# Patient Record
Sex: FEMALE | Race: WHITE | URBAN - METROPOLITAN AREA
[De-identification: names, ages, dates, MRNs, and addresses within clinical notes are randomized per-mention and may not be internally consistent; named-entity substitution may affect disease eponyms.]

---

## 2018-01-18 ENCOUNTER — EMERGENCY (EMERGENCY)
Facility: HOSPITAL | Age: 23
LOS: 0 days | Discharge: HOME | End: 2018-01-18

## 2018-01-18 DIAGNOSIS — Z79.899 OTHER LONG TERM (CURRENT) DRUG THERAPY: ICD-10-CM

## 2018-01-18 DIAGNOSIS — J09.X2 INFLUENZA DUE TO IDENTIFIED NOVEL INFLUENZA A VIRUS WITH OTHER RESPIRATORY MANIFESTATIONS: ICD-10-CM

## 2018-01-18 DIAGNOSIS — R05 COUGH: ICD-10-CM

## 2018-03-14 ENCOUNTER — EMERGENCY (EMERGENCY)
Facility: HOSPITAL | Age: 23
LOS: 0 days | Discharge: HOME | End: 2018-03-14

## 2018-03-14 VITALS
OXYGEN SATURATION: 100 % | TEMPERATURE: 97 F | HEART RATE: 70 BPM | DIASTOLIC BLOOD PRESSURE: 70 MMHG | SYSTOLIC BLOOD PRESSURE: 113 MMHG | RESPIRATION RATE: 16 BRPM

## 2018-03-14 DIAGNOSIS — J02.9 ACUTE PHARYNGITIS, UNSPECIFIED: ICD-10-CM

## 2018-03-14 DIAGNOSIS — J02.8 ACUTE PHARYNGITIS DUE TO OTHER SPECIFIED ORGANISMS: ICD-10-CM

## 2018-03-14 DIAGNOSIS — B97.89 OTHER VIRAL AGENTS AS THE CAUSE OF DISEASES CLASSIFIED ELSEWHERE: ICD-10-CM

## 2018-03-14 RX ORDER — IBUPROFEN 200 MG
600 TABLET ORAL ONCE
Qty: 0 | Refills: 0 | Status: COMPLETED | OUTPATIENT
Start: 2018-03-14 | End: 2018-03-14

## 2018-03-14 RX ORDER — DEXAMETHASONE 0.5 MG/5ML
10 ELIXIR ORAL ONCE
Qty: 0 | Refills: 0 | Status: COMPLETED | OUTPATIENT
Start: 2018-03-14 | End: 2018-03-14

## 2018-03-14 RX ADMIN — Medication 10 MILLIGRAM(S): at 04:06

## 2018-03-14 RX ADMIN — Medication 600 MILLIGRAM(S): at 04:05

## 2018-03-14 NOTE — ED PROVIDER NOTE - THROAT FINDINGS
NO TONGUE ELEVATION/no vesicles/uvula midline/THROAT RED/no exudate/NO VESICLES/ULCERS/NO DROOLING/NO STRIDOR

## 2018-03-14 NOTE — ED PROVIDER NOTE - OBJECTIVE STATEMENT
23 yo hx of acne of abx (ceftin) daily for 1.5 months present c/o sore throat x few hours. + dry coughing. swallowing worsen the pain. denies fever/chill/rhinorrhea/HA/dizziness/difficulty swallowing and breathing. denies abd pain/n/v/d.

## 2018-12-20 ENCOUNTER — EMERGENCY (EMERGENCY)
Facility: HOSPITAL | Age: 23
LOS: 0 days | Discharge: HOME | End: 2018-12-20
Admitting: PHYSICIAN ASSISTANT

## 2018-12-20 VITALS
SYSTOLIC BLOOD PRESSURE: 131 MMHG | TEMPERATURE: 98 F | OXYGEN SATURATION: 100 % | HEART RATE: 104 BPM | DIASTOLIC BLOOD PRESSURE: 84 MMHG | RESPIRATION RATE: 19 BRPM

## 2018-12-20 VITALS — HEART RATE: 96 BPM | OXYGEN SATURATION: 100 %

## 2018-12-20 DIAGNOSIS — B34.9 VIRAL INFECTION, UNSPECIFIED: ICD-10-CM

## 2018-12-20 DIAGNOSIS — J02.9 ACUTE PHARYNGITIS, UNSPECIFIED: ICD-10-CM

## 2018-12-20 RX ORDER — IBUPROFEN 200 MG
600 TABLET ORAL ONCE
Qty: 0 | Refills: 0 | Status: COMPLETED | OUTPATIENT
Start: 2018-12-20 | End: 2018-12-20

## 2018-12-20 RX ORDER — DEXAMETHASONE 0.5 MG/5ML
10 ELIXIR ORAL ONCE
Qty: 0 | Refills: 0 | Status: COMPLETED | OUTPATIENT
Start: 2018-12-20 | End: 2018-12-20

## 2018-12-20 RX ADMIN — Medication 600 MILLIGRAM(S): at 18:30

## 2018-12-20 RX ADMIN — Medication 10 MILLIGRAM(S): at 18:29

## 2018-12-20 NOTE — ED PROVIDER NOTE - PHYSICAL EXAMINATION
GENERAL: Well-developed, well-nourished, in no acute distress.  HEENT: Sclera clear. Conjunctiva non-injected. Tolerating oral secretions. Mucous membranes moist, oropharynx mildly erythematous without exudate. Tonsils 2+ bilaterally. Uvula midline and without edema. Nasal turbinates clear and without discharge.  NECK: supple w FROM. No cervical adenopathy.  CARDIOVASCULAR: Regular rate and rhythm, S1 and S2 present. No murmurs, rubs, or gallops. 2+ radial pulses bilaterally. No chest wall tenderness.  RESPIRATORY: Normal effort. Clear to auscultation bilaterally without wheezes, rales, or rhonchi.  INTEGUMENTARY: Warm, dry, no pallor or rashes. Capillary refill <2 seconds.  NEURO: A&Ox3. Speech clear. Gait normal.

## 2018-12-20 NOTE — ED PROVIDER NOTE - NS ED ROS FT
CONSTITUTIONAL: (+) fevers, (-) chills, (+) fatigue, (+) decreased appetite  EYES: (-) eye redness, (-) eye discharge  ENT: (-) ear pain, (-) ear drainage, (+) congestion, (-) rhinorrhea, (-) sinus pain, (+) sore throat, (-) difficulty swallowing  NECK: (-) neck pain, (-) lymphadenopathy  CARDIOVASCULAR: (-) chest pain, (-) palpitations  RESPIRATORY: (-) cough, (-) sputum, (-) shortness of breath  GI: (-) nausea, (-) vomiting, (-) diarrhea, (-) abdominal pain  MSK: (+) myalgias, (-) back pain, (-) gait difficulty  INTEGUMENTARY: (-) rashes, (-) pallor  NEURO: (-) headache, (-) dizziness, (-) lightheadedness

## 2018-12-20 NOTE — ED PROVIDER NOTE - PROGRESS NOTE DETAILS
patient seen and evaluated by me. discussed w patient that her symptoms likely d/t viral illness. will give 600 mg IBU and 10 mg po decadron prior to d/c. patient understands return precautions.

## 2018-12-20 NOTE — ED PROVIDER NOTE - OBJECTIVE STATEMENT
23 year old female w no significant pmhx presents to the ED with body aches, sore throat, low-grade fevers (tmax 100.2F), and nasal congestion for the last 2 days. patient states that her symptoms came on suddenly. no alleviating factors. she is not taking anything for her symptoms. Denies cough, SOB, chest pain, nausea, vomiting, diarrhea, abd pain, or decreased urine output. she did not receive the flu vaccine this year. 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

## 2020-04-26 ENCOUNTER — MESSAGE (OUTPATIENT)
Age: 25
End: 2020-04-26

## 2020-05-07 ENCOUNTER — APPOINTMENT (OUTPATIENT)
Dept: DISASTER EMERGENCY | Facility: CLINIC | Age: 25
End: 2020-05-07

## 2020-09-18 NOTE — ED PEDIATRIC NURSE NOTE - DISCHARGE DATE/TIME
budesonide-formoterol (SYMBICORT) 80-4.5 MCG/ACT inhaler 10.2 g 11 12/17/2019     Sig - Route: Inhale 2 puffs into the lungs 2 times daily. - Inhalation      LOV:12/17/19  Nov:around 12/17/20   14-Mar-2018 04:25

## 2021-06-04 ENCOUNTER — OUTPATIENT (OUTPATIENT)
Dept: OUTPATIENT SERVICES | Facility: HOSPITAL | Age: 26
LOS: 1 days | Discharge: HOME | End: 2021-06-04
Payer: COMMERCIAL

## 2021-06-04 VITALS
TEMPERATURE: 98 F | DIASTOLIC BLOOD PRESSURE: 80 MMHG | HEART RATE: 89 BPM | RESPIRATION RATE: 18 BRPM | SYSTOLIC BLOOD PRESSURE: 122 MMHG

## 2021-06-04 VITALS — DIASTOLIC BLOOD PRESSURE: 80 MMHG | SYSTOLIC BLOOD PRESSURE: 122 MMHG | HEART RATE: 89 BPM

## 2021-06-04 PROCEDURE — 99282 EMERGENCY DEPT VISIT SF MDM: CPT | Mod: 25

## 2021-06-04 PROCEDURE — 76817 TRANSVAGINAL US OBSTETRIC: CPT | Mod: 26

## 2021-06-04 NOTE — OB PROVIDER TRIAGE NOTE - NSHPPHYSICALEXAM_GEN_ALL_CORE
T(C): 36.7 (06-04-21 @ 12:10), Max: 36.7 (06-04-21 @ 12:10)  HR: 89 (06-04-21 @ 12:10) (89 - 89)  BP: 122/80 (06-04-21 @ 12:10) (122/80 - 122/80)  RR: 18 (06-04-21 @ 12:10) (18 - 18)    EFM: 138 bpm    Abd: soft, gravid, nontender, no CVA tenderness

## 2021-06-04 NOTE — OB PROVIDER TRIAGE NOTE - HISTORY OF PRESENT ILLNESS
25y  @ 18.5 weeks by LMP, confirmed by first trimester sonogram, JODY 10/31/2021, presents for abdominal tightening. Patient states she experienced upper abdominal tightening around 0700, associated with pressure in her back. She spoke to her OB who suggested eating and hydration. Patient states tightening was relieved with drinking and lying down. Denies trauma, HA, fever, blurry vision, chest pain, SOB, abdominal pain, constipation, diarrhea, nausea, vomiting, dysuria, frequency, hematuria, leg pain, and abnormal swelling of the hands and feet. Last sexual intercourse was 1 week ago. Last meal was this morning. Denies VB, LOF, and ctx. +FM. Last seen in the office on 2021. No complications during this pregnancy.

## 2021-06-04 NOTE — OB PROVIDER TRIAGE NOTE - NS_OBGYNHISTORY_OBGYN_ALL_OB_FT
OB Hx:  x 1. Largest 7-12                 G1 2020 FT  F 7-12 [facility] No complications +Epi    Gyn Hx:  Denies cysts, fibroids, abnormal paps, and STIs.

## 2021-06-04 NOTE — OB PROVIDER TRIAGE NOTE - NSOBPROVIDERNOTE_OBGYN_ALL_OB_FT
25y  @ 18.5 weeks, GBS unknown, not in  labor.    -Discharged Home  - Labor precautions reviewed  -PO Hydration encouraged  -Advised to count fetal kick counts  -Follow up with your scheduled OB visit 25y  @ 18.5 weeks, with resolved abdominal pain, long cervix, not in  labor.    -Discharged Home  - Labor precautions reviewed  -PO Hydration encouraged  -Follow up with your scheduled OB visit

## 2021-06-05 ENCOUNTER — TRANSCRIPTION ENCOUNTER (OUTPATIENT)
Age: 26
End: 2021-06-05

## 2023-08-03 NOTE — ED ADULT TRIAGE NOTE - TEMPERATURE IN FAHRENHEIT (DEGREES F)
Detail Level: Zone Initiate Treatment: Apply elidel  once daily for one more week then stop Continue Regimen: Apply cicalfate to face 1-2 times a day Render In Strict Bullet Format?: No Plan: Pt has greatly improved, pt will continue with desonide if needed for flares 96.9

## 2023-10-12 ENCOUNTER — OUTPATIENT (OUTPATIENT)
Dept: OUTPATIENT SERVICES | Facility: HOSPITAL | Age: 28
LOS: 1 days | End: 2023-10-12
Payer: SELF-PAY

## 2023-10-12 VITALS
RESPIRATION RATE: 16 BRPM | DIASTOLIC BLOOD PRESSURE: 76 MMHG | SYSTOLIC BLOOD PRESSURE: 113 MMHG | TEMPERATURE: 98 F | OXYGEN SATURATION: 100 % | HEIGHT: 63 IN | HEART RATE: 71 BPM | WEIGHT: 130.07 LBS

## 2023-10-12 DIAGNOSIS — Z01.818 ENCOUNTER FOR OTHER PREPROCEDURAL EXAMINATION: ICD-10-CM

## 2023-10-12 DIAGNOSIS — Z98.890 OTHER SPECIFIED POSTPROCEDURAL STATES: Chronic | ICD-10-CM

## 2023-10-12 DIAGNOSIS — N62 HYPERTROPHY OF BREAST: ICD-10-CM

## 2023-10-12 LAB
ALBUMIN SERPL ELPH-MCNC: 4.6 G/DL — SIGNIFICANT CHANGE UP (ref 3.5–5.2)
ALP SERPL-CCNC: 46 U/L — SIGNIFICANT CHANGE UP (ref 30–115)
ALT FLD-CCNC: 11 U/L — SIGNIFICANT CHANGE UP (ref 0–41)
ANION GAP SERPL CALC-SCNC: 11 MMOL/L — SIGNIFICANT CHANGE UP (ref 7–14)
APPEARANCE UR: CLEAR — SIGNIFICANT CHANGE UP
AST SERPL-CCNC: 14 U/L — SIGNIFICANT CHANGE UP (ref 0–41)
BACTERIA # UR AUTO: NEGATIVE /HPF — SIGNIFICANT CHANGE UP
BASOPHILS # BLD AUTO: 0.03 K/UL — SIGNIFICANT CHANGE UP (ref 0–0.2)
BASOPHILS NFR BLD AUTO: 0.4 % — SIGNIFICANT CHANGE UP (ref 0–1)
BILIRUB SERPL-MCNC: 1.1 MG/DL — SIGNIFICANT CHANGE UP (ref 0.2–1.2)
BILIRUB UR-MCNC: NEGATIVE — SIGNIFICANT CHANGE UP
BUN SERPL-MCNC: 14 MG/DL — SIGNIFICANT CHANGE UP (ref 10–20)
CALCIUM SERPL-MCNC: 9 MG/DL — SIGNIFICANT CHANGE UP (ref 8.4–10.5)
CAST: 0 /LPF — SIGNIFICANT CHANGE UP (ref 0–4)
CHLORIDE SERPL-SCNC: 104 MMOL/L — SIGNIFICANT CHANGE UP (ref 98–110)
CO2 SERPL-SCNC: 23 MMOL/L — SIGNIFICANT CHANGE UP (ref 17–32)
COLOR SPEC: YELLOW — SIGNIFICANT CHANGE UP
CREAT SERPL-MCNC: 0.9 MG/DL — SIGNIFICANT CHANGE UP (ref 0.7–1.5)
DIFF PNL FLD: ABNORMAL
EGFR: 89 ML/MIN/1.73M2 — SIGNIFICANT CHANGE UP
EOSINOPHIL # BLD AUTO: 0.3 K/UL — SIGNIFICANT CHANGE UP (ref 0–0.7)
EOSINOPHIL NFR BLD AUTO: 4 % — SIGNIFICANT CHANGE UP (ref 0–8)
GLUCOSE SERPL-MCNC: 85 MG/DL — SIGNIFICANT CHANGE UP (ref 70–99)
GLUCOSE UR QL: NEGATIVE MG/DL — SIGNIFICANT CHANGE UP
HCT VFR BLD CALC: 41.1 % — SIGNIFICANT CHANGE UP (ref 37–47)
HGB BLD-MCNC: 13 G/DL — SIGNIFICANT CHANGE UP (ref 12–16)
IMM GRANULOCYTES NFR BLD AUTO: 0.1 % — SIGNIFICANT CHANGE UP (ref 0.1–0.3)
KETONES UR-MCNC: NEGATIVE MG/DL — SIGNIFICANT CHANGE UP
LEUKOCYTE ESTERASE UR-ACNC: ABNORMAL
LYMPHOCYTES # BLD AUTO: 2.17 K/UL — SIGNIFICANT CHANGE UP (ref 1.2–3.4)
LYMPHOCYTES # BLD AUTO: 29 % — SIGNIFICANT CHANGE UP (ref 20.5–51.1)
MCHC RBC-ENTMCNC: 27.1 PG — SIGNIFICANT CHANGE UP (ref 27–31)
MCHC RBC-ENTMCNC: 31.6 G/DL — LOW (ref 32–37)
MCV RBC AUTO: 85.6 FL — SIGNIFICANT CHANGE UP (ref 81–99)
MONOCYTES # BLD AUTO: 0.32 K/UL — SIGNIFICANT CHANGE UP (ref 0.1–0.6)
MONOCYTES NFR BLD AUTO: 4.3 % — SIGNIFICANT CHANGE UP (ref 1.7–9.3)
NEUTROPHILS # BLD AUTO: 4.64 K/UL — SIGNIFICANT CHANGE UP (ref 1.4–6.5)
NEUTROPHILS NFR BLD AUTO: 62.2 % — SIGNIFICANT CHANGE UP (ref 42.2–75.2)
NITRITE UR-MCNC: NEGATIVE — SIGNIFICANT CHANGE UP
NRBC # BLD: 0 /100 WBCS — SIGNIFICANT CHANGE UP (ref 0–0)
PH UR: 6 — SIGNIFICANT CHANGE UP (ref 5–8)
PLATELET # BLD AUTO: 229 K/UL — SIGNIFICANT CHANGE UP (ref 130–400)
PMV BLD: 12 FL — HIGH (ref 7.4–10.4)
POTASSIUM SERPL-MCNC: 4.5 MMOL/L — SIGNIFICANT CHANGE UP (ref 3.5–5)
POTASSIUM SERPL-SCNC: 4.5 MMOL/L — SIGNIFICANT CHANGE UP (ref 3.5–5)
PROT SERPL-MCNC: 7.3 G/DL — SIGNIFICANT CHANGE UP (ref 6–8)
PROT UR-MCNC: NEGATIVE MG/DL — SIGNIFICANT CHANGE UP
RBC # BLD: 4.8 M/UL — SIGNIFICANT CHANGE UP (ref 4.2–5.4)
RBC # FLD: 13.2 % — SIGNIFICANT CHANGE UP (ref 11.5–14.5)
RBC CASTS # UR COMP ASSIST: 0 /HPF — SIGNIFICANT CHANGE UP (ref 0–4)
SODIUM SERPL-SCNC: 138 MMOL/L — SIGNIFICANT CHANGE UP (ref 135–146)
SP GR SPEC: 1.01 — SIGNIFICANT CHANGE UP (ref 1–1.03)
SQUAMOUS # UR AUTO: 1 /HPF — SIGNIFICANT CHANGE UP (ref 0–5)
UROBILINOGEN FLD QL: 0.2 MG/DL — SIGNIFICANT CHANGE UP (ref 0.2–1)
WBC # BLD: 7.47 K/UL — SIGNIFICANT CHANGE UP (ref 4.8–10.8)
WBC # FLD AUTO: 7.47 K/UL — SIGNIFICANT CHANGE UP (ref 4.8–10.8)
WBC UR QL: 2 /HPF — SIGNIFICANT CHANGE UP (ref 0–5)

## 2023-10-12 PROCEDURE — 85025 COMPLETE CBC W/AUTO DIFF WBC: CPT

## 2023-10-12 PROCEDURE — 99214 OFFICE O/P EST MOD 30 MIN: CPT | Mod: 25

## 2023-10-12 PROCEDURE — 87086 URINE CULTURE/COLONY COUNT: CPT

## 2023-10-12 PROCEDURE — 80053 COMPREHEN METABOLIC PANEL: CPT

## 2023-10-12 PROCEDURE — 81001 URINALYSIS AUTO W/SCOPE: CPT

## 2023-10-12 PROCEDURE — 36415 COLL VENOUS BLD VENIPUNCTURE: CPT

## 2023-10-12 NOTE — H&P PST ADULT - MUSCULOSKELETAL
normal/ROM intact/normal gait/strength 5/5 bilateral upper extremities/strength 5/5 bilateral lower extremities Tranexamic Acid Counseling:  Patient advised of the small risk of bleeding problems with tranexamic acid. They were also instructed to call if they developed any nausea, vomiting or diarrhea. All of the patient's questions and concerns were addressed.

## 2023-10-12 NOTE — H&P PST ADULT - REASON FOR ADMISSION
27 y/o female presents to PAST in preparation for bilateral breast augmentation in [facility] under GA with Dr. Rivera on 10/24/23

## 2023-10-12 NOTE — H&P PST ADULT - HISTORY OF PRESENT ILLNESS
29 y/o female presents to PAST in preparation for bilateral breast augmentation in [facility] under GA with Dr. Rivera on 10/24/23     Pt states that she currently has a B cup size and would like to go to a D cup size. Pt now for breast augmentation.    PATIENT CURRENTLY DENIES CHEST PAIN  SHORTNESS OF BREATH  PALPITATIONS,  DYSURIA, OR UPPER RESPIRATORY INFECTION IN PAST 2 WEEKS  Patient verbalized understanding of instructions and was given the opportunity to ask questions and have them answered.   As per patient, this is their complete medical and surgical history, including medications both prescribed or over the counter.  written and verbal instructions with teach back on chlorhexidine shampoo provided,  pt verbalized understanding with returned demonstration    Anesthesia Alert  NO--Difficult Airway  NO--History of neck surgery or radiation  NO--Limited ROM of neck  NO--History of Malignant hyperthermia  NO--Personal or family history of Pseudocholinesterase deficiency.  NO--Prior Anesthesia Complication  NO--Latex Allergy  NO--Loose teeth  NO--History of Rheumatoid Arthritis  NO--PATRIC  NO--Bleeding risk  NO--Other_____  Mallampati airway: Class II    Duke Activity Status Index (DASI) from [employer]  on 10/12/2023    RESULT SUMMARY:  58.2 points  The higher the score (maximum 58.2), the higher the functional status.    9.89 METs    INPUTS:  Take care of self —> 2.75 = Yes  Walk indoors —> 1.75 = Yes  Walk 1&ndash;2 blocks on level ground —> 2.75 = Yes  Climb a flight of stairs or walk up a hill —> 5.5 = Yes  Run a short distance —> 8 = Yes  Do light work around the house —> 2.7 = Yes  Do moderate work around the house —> 3.5 = Yes  Do heavy work around the house —> 8 = Yes  Do yardwork —> 4.5 = Yes  Have sexual relations —> 5.25 = Yes  Participate in moderate recreational activities —> 6 = Yes  Participate in strenuous sports —> 7.5 = Yes    Revised Cardiac Risk Index for Pre-Operative Risk 10/12/2023      RESULT SUMMARY:  0 points  Class I Risk    3.9 %  30-day risk of death, MI, or cardiac arrest    INPUTS:  Elevated-risk surgery —> 0 = No  History of ischemic heart disease —> 0 = No  History of congestive heart failure —> 0 = No  History of cerebrovascular disease —> 0 = No  Pre-operative treatment with insulin —> 0 = No  Pre-operative creatinine >2 mg/dL / 176.8 µmol/L —> 0 = No      Hypertrophy of breast    Encounter for other preprocedural examination    No pertinent family history in first degree relatives    18w5d    No pertinent past medical history    No significant past surgical history    [id number]    [id number]

## 2023-10-12 NOTE — H&P PST ADULT - ATTENDING COMMENTS
28 y.o. woman for elective breast augmentation. Risks, benefits, alternatives discussed. Consent given.

## 2023-10-12 NOTE — H&P PST ADULT - NSANTHOSAYNRD_GEN_A_CORE
No. PATRIC screening performed.  STOP BANG Legend: 0-2 = LOW Risk; 3-4 = INTERMEDIATE Risk; 5-8 = HIGH Risk

## 2023-10-13 DIAGNOSIS — N62 HYPERTROPHY OF BREAST: ICD-10-CM

## 2023-10-13 DIAGNOSIS — Z01.818 ENCOUNTER FOR OTHER PREPROCEDURAL EXAMINATION: ICD-10-CM

## 2023-10-13 LAB
CULTURE RESULTS: SIGNIFICANT CHANGE UP
SPECIMEN SOURCE: SIGNIFICANT CHANGE UP

## 2023-10-24 ENCOUNTER — TRANSCRIPTION ENCOUNTER (OUTPATIENT)
Age: 28
End: 2023-10-24

## 2023-10-24 ENCOUNTER — OUTPATIENT (OUTPATIENT)
Dept: OUTPATIENT SERVICES | Facility: HOSPITAL | Age: 28
LOS: 1 days | Discharge: HOME | End: 2023-10-24
Payer: SELF-PAY

## 2023-10-24 VITALS
WEIGHT: 130.07 LBS | HEART RATE: 84 BPM | HEIGHT: 63 IN | DIASTOLIC BLOOD PRESSURE: 84 MMHG | TEMPERATURE: 96 F | SYSTOLIC BLOOD PRESSURE: 135 MMHG | OXYGEN SATURATION: 99 % | RESPIRATION RATE: 17 BRPM

## 2023-10-24 VITALS — RESPIRATION RATE: 17 BRPM | HEART RATE: 69 BPM | SYSTOLIC BLOOD PRESSURE: 119 MMHG | DIASTOLIC BLOOD PRESSURE: 78 MMHG

## 2023-10-24 DIAGNOSIS — Z98.890 OTHER SPECIFIED POSTPROCEDURAL STATES: Chronic | ICD-10-CM

## 2023-10-24 DIAGNOSIS — N62 HYPERTROPHY OF BREAST: ICD-10-CM

## 2023-10-24 PROCEDURE — C9399: CPT

## 2023-10-24 PROCEDURE — C1789: CPT

## 2023-10-24 PROCEDURE — C1889: CPT

## 2023-10-24 RX ORDER — NORETHINDRONE AND ETHINYL ESTRADIOL 0.4-0.035
1 KIT ORAL
Refills: 0 | DISCHARGE

## 2023-10-24 RX ORDER — OXYCODONE AND ACETAMINOPHEN 5; 325 MG/1; MG/1
1 TABLET ORAL
Qty: 20 | Refills: 0
Start: 2023-10-24 | End: 2023-10-28

## 2023-10-24 RX ORDER — ONDANSETRON 8 MG/1
4 TABLET, FILM COATED ORAL ONCE
Refills: 0 | Status: DISCONTINUED | OUTPATIENT
Start: 2023-10-24 | End: 2023-10-24

## 2023-10-24 RX ORDER — ACETAMINOPHEN 500 MG
1000 TABLET ORAL ONCE
Refills: 0 | Status: COMPLETED | OUTPATIENT
Start: 2023-10-24 | End: 2023-10-24

## 2023-10-24 RX ORDER — HYDROMORPHONE HYDROCHLORIDE 2 MG/ML
0.5 INJECTION INTRAMUSCULAR; INTRAVENOUS; SUBCUTANEOUS
Refills: 0 | Status: DISCONTINUED | OUTPATIENT
Start: 2023-10-24 | End: 2023-10-24

## 2023-10-24 RX ORDER — DIAZEPAM 5 MG
1 TABLET ORAL
Qty: 9 | Refills: 0
Start: 2023-10-24 | End: 2023-10-26

## 2023-10-24 RX ORDER — SODIUM CHLORIDE 9 MG/ML
1000 INJECTION, SOLUTION INTRAVENOUS
Refills: 0 | Status: DISCONTINUED | OUTPATIENT
Start: 2023-10-24 | End: 2023-10-24

## 2023-10-24 RX ADMIN — Medication 400 MILLIGRAM(S): at 11:39

## 2023-10-24 RX ADMIN — Medication 1000 MILLIGRAM(S): at 11:57

## 2023-10-24 NOTE — ASU PREOP CHECKLIST - BOWEL PREP
n/a Muscle Hinge Flap Text: The defect edges were debeveled with a #15 scalpel blade.  Given the size, depth and location of the defect and the proximity to free margins a muscle hinge flap was deemed most appropriate.  Using a sterile surgical marker, an appropriate hinge flap was drawn incorporating the defect. The area thus outlined was incised with a #15 scalpel blade.  The skin margins were undermined to an appropriate distance in all directions utilizing iris scissors.

## 2023-10-24 NOTE — ASU PATIENT PROFILE, ADULT - BILL OF RIGHTS/ADMISSION INFORMATION PROVIDED TO:
Patient Duration Of Freeze Thaw-Cycle (Seconds): 10 Show Applicator Variable?: Yes Post-Care Instructions: I reviewed with the patient in detail post-care instructions. Patient is to wear sunprotection, and avoid picking at any of the treated lesions. Pt may apply Vaseline to crusted or scabbing areas. Consent: The patient's verbal consent was obtained including but not limited to risks of crusting, scabbing, blistering, scarring, darker or lighter pigmentary change, recurrence, incomplete removal and infection. Render Note In Bullet Format When Appropriate: No Detail Level: Simple Number Of Freeze-Thaw Cycles: 1 freeze-thaw cycle

## 2023-10-24 NOTE — ASU PATIENT PROFILE, ADULT - FALL HARM RISK - HARM RISK INTERVENTIONS

## 2023-10-24 NOTE — ASU DISCHARGE PLAN (ADULT/PEDIATRIC) - NS MD DC FALL RISK RISK
For information on Fall & Injury Prevention, visit: [email or web address] OR  [email or web address] OR  https://www.cdc.gov/steadi/patient.html

## 2023-10-28 DIAGNOSIS — N64.82 HYPOPLASIA OF BREAST: ICD-10-CM

## 2024-10-15 PROBLEM — Z00.00 ENCOUNTER FOR PREVENTIVE HEALTH EXAMINATION: Status: ACTIVE | Noted: 2024-10-15

## 2024-11-11 ENCOUNTER — APPOINTMENT (OUTPATIENT)
Dept: ORTHOPEDIC SURGERY | Facility: CLINIC | Age: 29
End: 2024-11-11
Payer: COMMERCIAL

## 2024-11-11 VITALS — BODY MASS INDEX: 22.15 KG/M2 | HEIGHT: 63 IN | WEIGHT: 125 LBS

## 2024-11-11 DIAGNOSIS — M79.641 PAIN IN RIGHT HAND: ICD-10-CM

## 2024-11-11 PROCEDURE — 73130 X-RAY EXAM OF HAND: CPT | Mod: RT

## 2024-11-11 PROCEDURE — 99202 OFFICE O/P NEW SF 15 MIN: CPT

## 2025-03-05 NOTE — ASU PATIENT PROFILE, ADULT - AS SC BRADEN SENSORY
History Of Present Illness  Christiana Alfaro is a 68 y.o. female presenting with a history of a left carotid arterectomy on January 15, 2025.  Patient returns to the office today to discuss the results of her carotid duplex.  She notes that the scar tissue in the left neck has really decreased with the use of Aquaphor.  No hoarse voice.  No difficulties chewing or swallowing.  Still has some bonnie-incisional numbness.     Past Medical History  She has a past medical history of Encounter for other screening for malignant neoplasm of breast (05/12/2016), Encounter for screening for malignant neoplasm of cervix (05/12/2016), Hyperlipidemia, Hypertension, Other specified symptoms and signs involving the circulatory and respiratory systems (08/10/2015), Peripheral vascular disease, unspecified (CMS-HCC) (07/13/2015), and Personal history of other benign neoplasm (04/13/2015).    Surgical History  She has a past surgical history that includes Colonoscopy (08/23/2016); Cholecystectomy (04/13/2015); and Other surgical history (04/13/2015).     Social History  She reports that she has been smoking cigarettes. She started smoking about 40 years ago. She has a 20.4 pack-year smoking history. She has never used smokeless tobacco. She reports current alcohol use. She reports that she does not use drugs.    Family History  Family History   Problem Relation Name Age of Onset    Hypertension Mother      Breast cancer Mother  65    Hypertension Father      Lung cancer Father      Diabetes Sister          Allergies  Atorvastatin, Cephalosporins, Other, Penicillins, Pravastatin, Shellfish containing products, and Sulfa (sulfonamide antibiotics)    Review of Systems    CONSTITUTIONAL: Denies weight loss, fever and chills.     HEENT: Denies changes in vision and hearing.     RESPIRATORY: Denies SOB and cough.     CV: Denies palpitations and CP.     GI: Denies abdominal pain, nausea, vomiting and diarrhea.     : Denies dysuria and  urinary frequency.     MSK: Denies myalgia and joint pain.     SKIN: Denies rash and pruritus.     VASC: Denies claudication, ischemic rest pain, or open wounds or sores     NEUROLOGICAL: Denies headache and syncope.     PSYCHIATRIC: Denies recent changes in mood. Denies anxiety and depression.     Physical Exam    General: Pt is alert and oriented x 3. Pleasant, conversive  HEENT: Head is atraumatic, normocephalic.  Left neck incision well-approximated.  Incision appearance has improved dramatically.    Cardiac: Normal S1-S2.  Regular rate and rhythm.  No murmurs.  Respiratory: Lungs clear to auscultation.  No adventitious sounds.  Abdomen: Soft, nondistended, nontender.  Bowel sounds x4 quadrants.  Pulse exam: Palpable brachial and radial pulses bilaterall.   Lower extremities are warm and well-perfused  Extremities: No significant edema noted.  Extremities are warm to the touch and normal in color.  No open wounds or sores.  Neuro: Moves all extremities spontaneously.  No focal deficits.   strength is 5+ bilaterally.  Dorsiflexion and plantarflexion 5+ bilaterally.  Smile is symmetrical.  Tongue is midline.  Psych: Appropriate affect.  Answers questions appropriately.     Last Recorded Vitals  /74 (BP Location: Right arm)   Pulse (!) 48   Wt 70.9 kg (156 lb 6.4 oz)   SpO2 100%     Relevant Results  Vascular US Carotid Artery Duplex Bilateral    Result Date: 3/4/2025  Preliminary Cardiology Report           [facility] [id number] [place] [address]            Phone [phone]      Preliminary Vascular Lab Report  [facility] US CAROTID ARTERY DUPLEX BILATERAL  Patient Name:     ANA Torrez Physician: [id number] Zina Murry MD,                   FABIÁN AVILEZVI Study Date:       3/4/2025         Ordering Provider: [id number] DAMIR PENN MRN/PID:          [id number]         Fellow: Accession#:       [id number]     Technologist:      Kimberlee Hampton  RVT YOB: 1956         Technologist 2: Gender:           F                Encounter#:        [id number] Admission Status: Outpatient       Location           [facility]                                    Performed:  Diagnosis/ICD: Occlusion and stenosis of bilateral carotid arteries-I65.23 CPT Codes:     69333 Cerebrovascular Carotid Duplex scan complete  Pertinent History: Left carotid endarterectomy on 1/15/2025.  PRELIMINARY CONCLUSIONS:  Right Carotid: Findings are consistent with less than 50% stenosis of the right proximal internal carotid artery. There are elevated velocities in the right ECA that are suggestive of disease. The right vertebral artery is patent with antegrade flow. No evidence of hemodynamically significant stenosis in the right subclavian artery. Left Carotid: Findings are consistent with less than 50% stenosis of the left proximal internal carotid artery. Left external carotid artery appears patent with no evidence of stenosis. The left vertebral artery is patent with antegrade flow. No evidence of hemodynamically significant stenosis in the left subclavian artery. Endarterectomy: Patent left carotid endarterectomy site following endarterectomy. Additional Findings: There is a mild delay in upstroke noted throughout the carotid arteries (bilaterally) which which may be suggestive of proximal obstruction versus aortic valve stenosis. May wish for further means of evaluation.  Imaging & Doppler Findings: Right Plaque Morph: The proximal right internal carotid artery demonstrates heterogenous plaque. The proximal right external carotid artery demonstrates heterogenous plaque. The mid right common carotid artery demonstrates heterogenous plaque. The distal right common carotid artery demonstrates heterogenous plaque.   Right                         Left   PSV      EDV                 PSV       cm/s 15 cm/s    CCA P    87 cm/s  26 cm/s 70 cm/s  25 cm/s    CCA D     69 cm/s  18 cm/s 94 cm/s  24 cm/s    ICA P    93 cm/s  23 cm/s 119 cm/s 26 cm/s    ICA M    101 cm/s 29 cm/s 88 cm/s  27 cm/s    ICA D    94 cm/s  22 cm/s 614 cm/s 228 cm/s    ECA     109 cm/s 29 cm/s 61 cm/s  17 cm/s  Vertebral  49 cm/s  12 cm/s 162 cm/s          Subclavian 138 cm/s                Right Left ICA/CCA Ratio  1.3  1.4   VASCULAR PRELIMINARY REPORT completed by Kimberlee Hampton JEFF on 3/4/2025 at 9:52:21 AM  ** Final **            Assessment/Plan   Stenosis  Status post carotid endarterectomy    I reviewed the results of the patient's carotid duplex which showed a patent left internal carotid artery.  There was some suggestion on the duplex of a delay in the upstroke noted throughout the carotid arteries bilaterally which may be suggestive of more proximal obstruction, however, patient had CT angiogram prior to carotid endarterectomy which did not show such a finding.    Recommend continuation of fenofibrate and aspirin.  Patient is statin intolerant.  She should follow-up in 6 months with reevaluation with a carotid duplex and follow-up office visit.           Moises Chery, APRN-CNP    (4) no impairment